# Patient Record
Sex: FEMALE | Race: BLACK OR AFRICAN AMERICAN | NOT HISPANIC OR LATINO | URBAN - METROPOLITAN AREA
[De-identification: names, ages, dates, MRNs, and addresses within clinical notes are randomized per-mention and may not be internally consistent; named-entity substitution may affect disease eponyms.]

---

## 2017-08-07 PROBLEM — 2901004 MELENA: Status: ACTIVE | Noted: 2017-08-07

## 2020-01-08 PROBLEM — 238131007 OVERWEIGHT: Status: ACTIVE | Noted: 2020-01-08

## 2020-11-18 ENCOUNTER — OFFICE VISIT (OUTPATIENT)
Dept: URBAN - METROPOLITAN AREA SURGERY CENTER 7 | Facility: SURGERY CENTER | Age: 50
End: 2020-11-18

## 2022-04-30 ENCOUNTER — TELEPHONE ENCOUNTER (OUTPATIENT)
Dept: URBAN - METROPOLITAN AREA CLINIC 121 | Facility: CLINIC | Age: 52
End: 2022-04-30

## 2022-04-30 RX ORDER — FLUOROMETHOLONE 2.5 MG/ML
BID SUSPENSION/ DROPS OPHTHALMIC
OUTPATIENT
Start: 2015-04-22

## 2022-04-30 RX ORDER — ADALIMUMAB 40MG/0.4ML
KIT SUBCUTANEOUS
OUTPATIENT
Start: 2020-01-08

## 2022-04-30 RX ORDER — BUDESONIDE 9 MG/1
1 TABLET PO QD TABLET, EXTENDED RELEASE ORAL
OUTPATIENT
Start: 2015-04-22 | End: 2017-08-07

## 2022-04-30 RX ORDER — ADALIMUMAB 40MG/0.8ML
INJECT ONE PEN (40 MG) SUBCUTANEOUSLY EVERY WEEK. REFRIGERATE KIT SUBCUTANEOUS
OUTPATIENT
Start: 2018-08-09

## 2022-04-30 RX ORDER — SODIUM SULFATE, POTASSIUM SULFATE, MAGNESIUM SULFATE 17.5; 3.13; 1.6 G/ML; G/ML; G/ML
MIX AND USE AS DIRECTED. MAY SUB FOR GOLYTELY SOLUTION, CONCENTRATE ORAL
OUTPATIENT
Start: 2020-11-09

## 2022-04-30 RX ORDER — FLUOROMETHOLONE 2.5 MG/ML
BID SUSPENSION/ DROPS OPHTHALMIC
OUTPATIENT
Start: 2015-04-22 | End: 2017-08-07

## 2022-04-30 RX ORDER — DICLOFENAC SODIUM 50 MG/1
BID TABLET, DELAYED RELEASE ORAL
OUTPATIENT
Start: 2015-04-22 | End: 2017-08-07

## 2022-04-30 RX ORDER — BUDESONIDE 9 MG/1
1 TABLET PO QD TABLET, EXTENDED RELEASE ORAL
OUTPATIENT
Start: 2015-04-22

## 2022-04-30 RX ORDER — HEPATITIS A AND HEPATITIS B (RECOMBINANT) VACCINE 720; 20 [IU]/ML; UG/ML
INJECT 1 VIAL  IM AS DIRECTED...3 SERIES SHOT...0, 1, 6 MONTHS INJECTION, SUSPENSION INTRAMUSCULAR
OUTPATIENT
Start: 2020-01-09

## 2022-04-30 RX ORDER — POLYETHYLENE GLYCOL 3350, SODIUM SULFATE, SODIUM CHLORIDE, POTASSIUM CHLORIDE, ASCORBIC ACID, SODIUM ASCORBATE 140-9-5.2G
MIX AND USE DIRECTED.   USE CO-PAY CARD BELOW:  BIN 019158  PCN: CNRX GROUP: AC68037001 ID: 5937377999 KIT ORAL
OUTPATIENT
Start: 2020-11-04

## 2022-04-30 RX ORDER — ZOLPIDEM TARTRATE 5 MG
TABLET ORAL
OUTPATIENT
Start: 2020-01-08

## 2022-04-30 RX ORDER — ADALIMUMAB 40MG/0.8ML
INJECT ONE PEN SUBCUTANEOUSLY EVERY WEEK KIT SUBCUTANEOUS
OUTPATIENT
Start: 2020-01-08

## 2022-04-30 RX ORDER — DICLOFENAC SODIUM 50 MG/1
BID TABLET, DELAYED RELEASE ORAL
OUTPATIENT
Start: 2015-04-22

## 2022-04-30 RX ORDER — TRAMADOL HYDROCHLORIDE 50 MG/1
TABLET, FILM COATED ORAL
OUTPATIENT
Start: 2017-08-07

## 2022-04-30 RX ORDER — ADALIMUMAB 40MG/0.8ML
INJECT 40MG SUBQ ONCE A WEEK AS DIRECTED KIT SUBCUTANEOUS
OUTPATIENT
Start: 2015-10-05

## 2022-04-30 RX ORDER — IBUPROFEN 800 MG/1
TABLET, FILM COATED ORAL
OUTPATIENT
Start: 2020-01-08

## 2022-04-30 RX ORDER — ADALIMUMAB 40MG/0.8ML
INJECT ONE PEN SUBCUTANEOUSLY EVERY WEEK. REFRIGERATE KIT SUBCUTANEOUS
OUTPATIENT
Start: 2021-03-26

## 2022-05-01 ENCOUNTER — TELEPHONE ENCOUNTER (OUTPATIENT)
Dept: URBAN - METROPOLITAN AREA CLINIC 121 | Facility: CLINIC | Age: 52
End: 2022-05-01

## 2022-05-01 RX ORDER — SODIUM SULFATE, POTASSIUM SULFATE, MAGNESIUM SULFATE 17.5; 3.13; 1.6 G/ML; G/ML; G/ML
DISSOLVE BY MOUTH AS DIRECTED SOLUTION, CONCENTRATE ORAL
Status: ACTIVE | COMMUNITY
Start: 2020-11-09

## 2022-05-01 RX ORDER — IBUPROFEN 800 MG/1
TABLET BY MOUTH TABLET, FILM COATED ORAL
Status: ACTIVE | COMMUNITY
Start: 2020-01-08

## 2022-05-01 RX ORDER — HEPATITIS A AND HEPATITIS B (RECOMBINANT) VACCINE 720; 20 [IU]/ML; UG/ML
INJECT 1 VIAL  IM AS DIRECTED...3 SERIES SHOT...0, 1, 6 MONTHS INJECTION, SUSPENSION INTRAMUSCULAR
Status: ACTIVE | COMMUNITY
Start: 2020-01-09

## 2022-05-01 RX ORDER — ZOLPIDEM TARTRATE 5 MG
TABLET BY MOUTH TABLET ORAL
Status: ACTIVE | COMMUNITY
Start: 2020-01-08

## 2022-05-01 RX ORDER — ADALIMUMAB 40MG/0.4ML
KIT SUBCUTANEOUS
Status: ACTIVE | COMMUNITY
Start: 2020-01-08

## 2022-05-01 RX ORDER — TRAMADOL HYDROCHLORIDE 50 MG/1
TABLET BY MOUTH TABLET, FILM COATED ORAL
Status: ACTIVE | COMMUNITY
Start: 2017-08-07

## 2022-05-01 RX ORDER — ADALIMUMAB 40MG/0.8ML
INJECT 1 PEN INJECTOR SUBCUTANEOUSLY ONCE A WEEK KIT SUBCUTANEOUS
Status: ACTIVE | COMMUNITY
Start: 2021-03-26 | End: 2022-07-29

## 2022-05-01 RX ORDER — POLYETHYLENE GLYCOL 3350, SODIUM SULFATE, SODIUM CHLORIDE, POTASSIUM CHLORIDE, ASCORBIC ACID, SODIUM ASCORBATE 140-9-5.2G
DISSOLVE AS DIRECTED KIT ORAL
Status: ACTIVE | COMMUNITY
Start: 2020-11-04

## 2022-08-24 ENCOUNTER — TELEPHONE ENCOUNTER (OUTPATIENT)
Dept: URBAN - METROPOLITAN AREA CLINIC 27 | Facility: CLINIC | Age: 52
End: 2022-08-24

## 2022-09-02 ENCOUNTER — OFFICE VISIT (OUTPATIENT)
Dept: URBAN - METROPOLITAN AREA CLINIC 27 | Facility: CLINIC | Age: 52
End: 2022-09-02
Payer: COMMERCIAL

## 2022-09-02 ENCOUNTER — WEB ENCOUNTER (OUTPATIENT)
Dept: URBAN - METROPOLITAN AREA CLINIC 27 | Facility: CLINIC | Age: 52
End: 2022-09-02

## 2022-09-02 VITALS
DIASTOLIC BLOOD PRESSURE: 69 MMHG | WEIGHT: 152 LBS | SYSTOLIC BLOOD PRESSURE: 107 MMHG | HEART RATE: 88 BPM | HEIGHT: 66 IN | RESPIRATION RATE: 17 BRPM | BODY MASS INDEX: 24.43 KG/M2 | TEMPERATURE: 96.9 F

## 2022-09-02 DIAGNOSIS — M06.9 RHEUMATOID ARTHRITIS, INVOLVING UNSPECIFIED SITE, UNSPECIFIED WHETHER RHEUMATOID FACTOR PRESENT: ICD-10-CM

## 2022-09-02 DIAGNOSIS — K50.90 CROHN'S DISEASE WITHOUT COMPLICATION, UNSPECIFIED GASTROINTESTINAL TRACT LOCATION: ICD-10-CM

## 2022-09-02 PROBLEM — 69896004: Status: ACTIVE | Noted: 2022-09-02

## 2022-09-02 PROBLEM — 34000006: Status: ACTIVE | Noted: 2022-09-02

## 2022-09-02 PROCEDURE — 99214 OFFICE O/P EST MOD 30 MIN: CPT | Performed by: INTERNAL MEDICINE

## 2022-09-02 RX ORDER — POLYETHYLENE GLYCOL 3350, SODIUM SULFATE, SODIUM CHLORIDE, POTASSIUM CHLORIDE, ASCORBIC ACID, SODIUM ASCORBATE 140-9-5.2G
DISSOLVE AS DIRECTED KIT ORAL
Status: DISCONTINUED | COMMUNITY
Start: 2020-11-04

## 2022-09-02 RX ORDER — SODIUM SULFATE, POTASSIUM SULFATE, MAGNESIUM SULFATE 17.5; 3.13; 1.6 G/ML; G/ML; G/ML
DISSOLVE BY MOUTH AS DIRECTED SOLUTION, CONCENTRATE ORAL
Status: ACTIVE | COMMUNITY
Start: 2020-11-09

## 2022-09-02 RX ORDER — ZOLPIDEM TARTRATE 5 MG
TABLET BY MOUTH TABLET ORAL
Status: ACTIVE | COMMUNITY
Start: 2020-01-08

## 2022-09-02 RX ORDER — IBUPROFEN 800 MG/1
TABLET BY MOUTH TABLET, FILM COATED ORAL
Status: DISCONTINUED | COMMUNITY
Start: 2020-01-08

## 2022-09-02 RX ORDER — ADALIMUMAB 40MG/0.4ML
AS DIRECTED KIT SUBCUTANEOUS
Qty: 30 | Refills: 3

## 2022-09-02 RX ORDER — HEPATITIS A AND HEPATITIS B (RECOMBINANT) VACCINE 720; 20 [IU]/ML; UG/ML
INJECT 1 VIAL  IM AS DIRECTED...3 SERIES SHOT...0, 1, 6 MONTHS INJECTION, SUSPENSION INTRAMUSCULAR
Status: ACTIVE | COMMUNITY
Start: 2020-01-09

## 2022-09-02 RX ORDER — ADALIMUMAB 40MG/0.4ML
KIT SUBCUTANEOUS
Status: ACTIVE | COMMUNITY
Start: 2020-01-08

## 2022-09-02 RX ORDER — TRAMADOL HYDROCHLORIDE 50 MG/1
TABLET BY MOUTH TABLET, FILM COATED ORAL
Status: ACTIVE | COMMUNITY
Start: 2017-08-07

## 2022-09-02 NOTE — HPI-TODAY'S VISIT:
Ms. Duarte is a 52-year-old established patient with Crohn's disease last seen in the office in 2020. Her disease has been stable for many years, and she is maintained on Humira once weekly injection. She has rheumatoid arthritis and followed by Dr. Saeed. She recently had labs at his office. She is up-to-date on her eye exam. Her last DEXA scan was 3 years ago. States she does not want another one. No skin rashes. She reports 2-3 nonbloody formed stool per day. She denies nausea, vomiting, abd pain, diarrhea, constipation, melena or hematochezia. 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 Bx - mild chronic ileitis. Otherwise, Quiescent appearance. She is due for a surveillance colonoscopy. Previous Rx for Hep A/B vaccine series noted in her chart. She indicates she received the vaccines.

## 2022-09-20 ENCOUNTER — TELEPHONE ENCOUNTER (OUTPATIENT)
Dept: URBAN - METROPOLITAN AREA CLINIC 27 | Facility: CLINIC | Age: 52
End: 2022-09-20

## 2022-09-20 RX ORDER — ADALIMUMAB 40MG/0.4ML
AS DIRECTED KIT SUBCUTANEOUS
Qty: 30 | Refills: 3

## 2022-09-23 ENCOUNTER — TELEPHONE ENCOUNTER (OUTPATIENT)
Dept: URBAN - METROPOLITAN AREA CLINIC 27 | Facility: CLINIC | Age: 52
End: 2022-09-23

## 2023-01-09 ENCOUNTER — TELEPHONE ENCOUNTER (OUTPATIENT)
Dept: URBAN - METROPOLITAN AREA CLINIC 27 | Facility: CLINIC | Age: 53
End: 2023-01-09

## 2023-01-09 RX ORDER — ADALIMUMAB 40MG/0.4ML
AS DIRECTED KIT SUBCUTANEOUS
Qty: 30 | Refills: 3
End: 2023-05-09

## 2023-05-17 ENCOUNTER — TELEPHONE ENCOUNTER (OUTPATIENT)
Dept: URBAN - METROPOLITAN AREA CLINIC 27 | Facility: CLINIC | Age: 53
End: 2023-05-17

## 2023-05-17 RX ORDER — ADALIMUMAB 40MG/0.4ML
INJECT 1 PEN KIT SUBCUTANEOUS
Qty: 2 KIT | Refills: 4

## 2023-05-22 ENCOUNTER — TELEPHONE ENCOUNTER (OUTPATIENT)
Dept: URBAN - METROPOLITAN AREA CLINIC 27 | Facility: CLINIC | Age: 53
End: 2023-05-22

## 2023-05-22 RX ORDER — ADALIMUMAB 40MG/0.4ML
INJECT 1 PEN KIT SUBCUTANEOUS
Qty: 2 KIT | Refills: 4
End: 2023-10-19

## 2023-06-26 ENCOUNTER — OFFICE VISIT (OUTPATIENT)
Dept: URBAN - METROPOLITAN AREA CLINIC 27 | Facility: CLINIC | Age: 53
End: 2023-06-26
Payer: COMMERCIAL

## 2023-06-26 ENCOUNTER — LAB OUTSIDE AN ENCOUNTER (OUTPATIENT)
Dept: URBAN - METROPOLITAN AREA CLINIC 27 | Facility: CLINIC | Age: 53
End: 2023-06-26

## 2023-06-26 VITALS
DIASTOLIC BLOOD PRESSURE: 74 MMHG | WEIGHT: 155 LBS | HEART RATE: 89 BPM | SYSTOLIC BLOOD PRESSURE: 104 MMHG | BODY MASS INDEX: 24.91 KG/M2 | HEIGHT: 66 IN | RESPIRATION RATE: 17 BRPM

## 2023-06-26 DIAGNOSIS — K50.90 ABDOMINAL PAIN DESPITE THERAPY FOR CROHN'S DISEASE: ICD-10-CM

## 2023-06-26 DIAGNOSIS — R14.0 ABDOMINAL BLOATING: ICD-10-CM

## 2023-06-26 PROCEDURE — 99214 OFFICE O/P EST MOD 30 MIN: CPT | Performed by: INTERNAL MEDICINE

## 2023-06-26 RX ORDER — ADALIMUMAB 40MG/0.4ML
AS DIRECTED KIT SUBCUTANEOUS
Qty: 4 | Refills: 11 | OUTPATIENT
Start: 2023-06-26 | End: 2024-06-20

## 2023-06-26 RX ORDER — SODIUM SULFATE, POTASSIUM SULFATE, MAGNESIUM SULFATE 17.5; 3.13; 1.6 G/ML; G/ML; G/ML
DISSOLVE BY MOUTH AS DIRECTED SOLUTION, CONCENTRATE ORAL
Status: ACTIVE | COMMUNITY
Start: 2020-11-09

## 2023-06-26 RX ORDER — HEPATITIS A AND HEPATITIS B (RECOMBINANT) VACCINE 720; 20 [IU]/ML; UG/ML
INJECT 1 VIAL  IM AS DIRECTED...3 SERIES SHOT...0, 1, 6 MONTHS INJECTION, SUSPENSION INTRAMUSCULAR
Status: ACTIVE | COMMUNITY
Start: 2020-01-09

## 2023-06-26 RX ORDER — ADALIMUMAB 40MG/0.4ML
INJECT 1 PEN KIT SUBCUTANEOUS
Qty: 2 KIT | Refills: 4 | Status: ACTIVE | COMMUNITY
End: 2023-10-19

## 2023-06-26 RX ORDER — ZOLPIDEM TARTRATE 5 MG
TABLET BY MOUTH TABLET ORAL
Status: ACTIVE | COMMUNITY
Start: 2020-01-08

## 2023-06-26 RX ORDER — TRAMADOL HYDROCHLORIDE 50 MG/1
TABLET BY MOUTH TABLET, FILM COATED ORAL
Status: ACTIVE | COMMUNITY
Start: 2017-08-07

## 2023-06-26 NOTE — HPI-ZZZTODAY'S VISIT
Ms. Duarte is a 53-year-old female patient of Dr. Griffin presenting for colonoscopy.  She has Crohn's disease, last seen 1 year ago, disease has been stable for many years.  She is on Humira weekly, though ran out approx. 1 month ago. BMs are regular, no abdominal pain. She has rare episodes of trace red blood per on the TP which she attributes to hemorrhoids. She takes Miralax QD for bloating though this is not very helpful; she denies constipation even in the absence of Miralax.  She has RA and is followed by Dr. Saeed.  Last DEXA scan was 3 years ago, normal, does not want another at this time.  . Colonoscopy 2020: Noninflamed stricture at the ileocecal valve; path shows mild chronic ileitis; otherwise quiescent appearing; recall 2 years

## 2023-06-27 ENCOUNTER — TELEPHONE ENCOUNTER (OUTPATIENT)
Dept: URBAN - METROPOLITAN AREA CLINIC 27 | Facility: CLINIC | Age: 53
End: 2023-06-27

## 2023-06-27 LAB
A/G RATIO: 1.3
ABSOLUTE BASOPHILS: 38
ABSOLUTE EOSINOPHILS: 62
ABSOLUTE LYMPHOCYTES: 1661
ABSOLUTE MONOCYTES: 787
ABSOLUTE NEUTROPHILS: 2251
ALBUMIN: 4.2
ALKALINE PHOSPHATASE: 69
ALT (SGPT): 22
AST (SGOT): 25
BASOPHILS: 0.8
BILIRUBIN, TOTAL: 0.3
BUN/CREATININE RATIO: (no result)
BUN: 12
CALCIUM: 9.4
CARBON DIOXIDE, TOTAL: 24
CHLORIDE: 106
CREATININE: 0.79
EGFR: 91
EOSINOPHILS: 1.3
FOLATE (FOLIC ACID), SERUM: 12.6
GLOBULIN, TOTAL: 3.3
GLUCOSE: 66
HEMATOCRIT: 32.5
HEMOGLOBIN: 10.8
LYMPHOCYTES: 34.6
MCH: 27.9
MCHC: 33.2
MCV: 84
MONOCYTES: 16.4
MPV: 9.9
NEUTROPHILS: 46.9
PLATELET COUNT: 355
POTASSIUM: 4.3
PROTEIN, TOTAL: 7.5
RDW: 16
RED BLOOD CELL COUNT: 3.87
SODIUM: 141
VITAMIN B12: 419
WHITE BLOOD CELL COUNT: 4.8

## 2023-06-27 RX ORDER — ADALIMUMAB 40MG/0.4ML
AS DIRECTED KIT SUBCUTANEOUS
Qty: 4 | Refills: 11
Start: 2023-06-26 | End: 2024-06-21

## 2023-07-25 ENCOUNTER — TELEPHONE ENCOUNTER (OUTPATIENT)
Dept: URBAN - METROPOLITAN AREA CLINIC 27 | Facility: CLINIC | Age: 53
End: 2023-07-25

## 2023-08-23 ENCOUNTER — OFFICE VISIT (OUTPATIENT)
Dept: URBAN - METROPOLITAN AREA SURGERY CENTER 7 | Facility: SURGERY CENTER | Age: 53
End: 2023-08-23

## 2023-09-05 ENCOUNTER — WEB ENCOUNTER (OUTPATIENT)
Dept: URBAN - METROPOLITAN AREA CLINIC 27 | Facility: CLINIC | Age: 53
End: 2023-09-05

## 2023-09-05 ENCOUNTER — OFFICE VISIT (OUTPATIENT)
Dept: URBAN - METROPOLITAN AREA CLINIC 27 | Facility: CLINIC | Age: 53
End: 2023-09-05
Payer: COMMERCIAL

## 2023-09-05 ENCOUNTER — DASHBOARD ENCOUNTERS (OUTPATIENT)
Age: 53
End: 2023-09-05

## 2023-09-05 VITALS
WEIGHT: 153 LBS | SYSTOLIC BLOOD PRESSURE: 105 MMHG | BODY MASS INDEX: 24.59 KG/M2 | HEIGHT: 66 IN | DIASTOLIC BLOOD PRESSURE: 73 MMHG | HEART RATE: 100 BPM

## 2023-09-05 DIAGNOSIS — K50.90 CROHN'S DISEASE: ICD-10-CM

## 2023-09-05 DIAGNOSIS — R14.0 ABDOMINAL BLOATING: ICD-10-CM

## 2023-09-05 DIAGNOSIS — K21.9 ACID REFLUX: ICD-10-CM

## 2023-09-05 PROBLEM — 116289008: Status: ACTIVE | Noted: 2023-06-26

## 2023-09-05 PROBLEM — 235595009: Status: ACTIVE | Noted: 2023-09-05

## 2023-09-05 PROCEDURE — 99214 OFFICE O/P EST MOD 30 MIN: CPT | Performed by: INTERNAL MEDICINE

## 2023-09-05 RX ORDER — SODIUM SULFATE, POTASSIUM SULFATE, MAGNESIUM SULFATE 17.5; 3.13; 1.6 G/ML; G/ML; G/ML
DISSOLVE BY MOUTH AS DIRECTED SOLUTION, CONCENTRATE ORAL
Status: ACTIVE | COMMUNITY
Start: 2020-11-09

## 2023-09-05 RX ORDER — ADALIMUMAB 40MG/0.4ML
AS DIRECTED KIT SUBCUTANEOUS
Qty: 4 | Refills: 11 | Status: ACTIVE | COMMUNITY
Start: 2023-06-26 | End: 2024-06-21

## 2023-09-05 RX ORDER — TRAMADOL HYDROCHLORIDE 50 MG/1
TABLET BY MOUTH TABLET, FILM COATED ORAL
Status: ACTIVE | COMMUNITY
Start: 2017-08-07

## 2023-09-05 RX ORDER — ZOLPIDEM TARTRATE 5 MG
TABLET BY MOUTH TABLET ORAL
Status: ACTIVE | COMMUNITY
Start: 2020-01-08

## 2023-09-05 RX ORDER — HEPATITIS A AND HEPATITIS B (RECOMBINANT) VACCINE 720; 20 [IU]/ML; UG/ML
INJECT 1 VIAL  IM AS DIRECTED...3 SERIES SHOT...0, 1, 6 MONTHS INJECTION, SUSPENSION INTRAMUSCULAR
Status: ACTIVE | COMMUNITY
Start: 2020-01-09

## 2023-09-05 RX ORDER — ADALIMUMAB 40MG/0.4ML
INJECT 1 PEN KIT SUBCUTANEOUS
Qty: 2 KIT | Refills: 4 | Status: ACTIVE | COMMUNITY
End: 2023-10-19

## 2023-09-05 NOTE — HPI-ZZZTODAY'S VISIT
Ms. Duarte is a 53-year-old female patient of Dr. Griffin presenting for evaluation of heartburn and bloating. She has Crohn's disease, stable on weekly Humira. She has colonoscopy scheduled for next month.  Labs were drawn in June showing mild anemia. She presents today c/o intermittent abdominal cramping with associated distention/bloating as well as recent onset heartburn. She has heartburn sx once or twice weekly, takes Rolaids prn. She is taking Align, Miralax with partial improvement of her bloating. BMs are moving well, no constipation. She is s/p hysterectomy. . Colonoscopy 2020:Noninflamed stricture at the ileocecal valve; path shows mild chronic ileitis; otherwise quiescent appearing; recall 2 years

## 2023-09-05 NOTE — PHYSICAL EXAM GASTROINTESTINAL
Abdomen , soft, mild LLQ TTP, nondistended , no guarding or rigidity , no masses palpable , normal bowel sounds , Liver and Spleen , no hepatomegaly present , no hepatosplenomegaly , liver nontender , spleen not palpable

## 2023-09-06 ENCOUNTER — LAB OUTSIDE AN ENCOUNTER (OUTPATIENT)
Dept: URBAN - METROPOLITAN AREA CLINIC 27 | Facility: CLINIC | Age: 53
End: 2023-09-06

## 2023-09-11 LAB
H PYLORI BREATH TEST: NOT DETECTED
H. PYLORI BREATH TEST: NOT DETECTED
INTERPRETATION: NOT DETECTED

## 2023-09-13 ENCOUNTER — OFFICE VISIT (OUTPATIENT)
Dept: URBAN - METROPOLITAN AREA SURGERY CENTER 7 | Facility: SURGERY CENTER | Age: 53
End: 2023-09-13

## 2023-09-27 ENCOUNTER — OFFICE VISIT (OUTPATIENT)
Dept: URBAN - METROPOLITAN AREA SURGERY CENTER 7 | Facility: SURGERY CENTER | Age: 53
End: 2023-09-27

## 2023-10-04 ENCOUNTER — OFFICE VISIT (OUTPATIENT)
Dept: URBAN - METROPOLITAN AREA SURGERY CENTER 7 | Facility: SURGERY CENTER | Age: 53
End: 2023-10-04

## 2023-10-11 ENCOUNTER — OFFICE VISIT (OUTPATIENT)
Dept: URBAN - METROPOLITAN AREA SURGERY CENTER 7 | Facility: SURGERY CENTER | Age: 53
End: 2023-10-11

## 2023-10-27 ENCOUNTER — OFFICE VISIT (OUTPATIENT)
Dept: URBAN - METROPOLITAN AREA SURGERY CENTER 7 | Facility: SURGERY CENTER | Age: 53
End: 2023-10-27
Payer: COMMERCIAL

## 2023-10-27 ENCOUNTER — CLAIMS CREATED FROM THE CLAIM WINDOW (OUTPATIENT)
Dept: URBAN - METROPOLITAN AREA CLINIC 4 | Facility: CLINIC | Age: 53
End: 2023-10-27
Payer: COMMERCIAL

## 2023-10-27 DIAGNOSIS — K63.89 OTHER SPECIFIED DISEASES OF INTESTINE: ICD-10-CM

## 2023-10-27 DIAGNOSIS — K52.9 INFLAMMATION OF COLONIC MUCOSA: ICD-10-CM

## 2023-10-27 DIAGNOSIS — K50.90 CROHN DISEASE: ICD-10-CM

## 2023-10-27 DIAGNOSIS — K63.89 APPENDICITIS EPIPLOICA: ICD-10-CM

## 2023-10-27 PROCEDURE — 45380 COLONOSCOPY AND BIOPSY: CPT | Performed by: INTERNAL MEDICINE

## 2023-10-27 PROCEDURE — 00811 ANES LWR INTST NDSC NOS: CPT | Performed by: NURSE ANESTHETIST, CERTIFIED REGISTERED

## 2023-10-27 PROCEDURE — G8907 PT DOC NO EVENTS ON DISCHARG: HCPCS | Performed by: INTERNAL MEDICINE

## 2023-10-27 PROCEDURE — 88305 TISSUE EXAM BY PATHOLOGIST: CPT | Performed by: PATHOLOGY

## 2023-10-27 RX ORDER — ZOLPIDEM TARTRATE 5 MG
TABLET BY MOUTH TABLET ORAL
Status: ACTIVE | COMMUNITY
Start: 2020-01-08

## 2023-10-27 RX ORDER — SODIUM SULFATE, POTASSIUM SULFATE, MAGNESIUM SULFATE 17.5; 3.13; 1.6 G/ML; G/ML; G/ML
DISSOLVE BY MOUTH AS DIRECTED SOLUTION, CONCENTRATE ORAL
Status: ACTIVE | COMMUNITY
Start: 2020-11-09

## 2023-10-27 RX ORDER — ADALIMUMAB 40MG/0.4ML
AS DIRECTED KIT SUBCUTANEOUS
Qty: 4 | Refills: 11 | Status: ACTIVE | COMMUNITY
Start: 2023-06-26 | End: 2024-06-21

## 2023-10-27 RX ORDER — TRAMADOL HYDROCHLORIDE 50 MG/1
TABLET BY MOUTH TABLET, FILM COATED ORAL
Status: ACTIVE | COMMUNITY
Start: 2017-08-07

## 2023-10-27 RX ORDER — HEPATITIS A AND HEPATITIS B (RECOMBINANT) VACCINE 720; 20 [IU]/ML; UG/ML
INJECT 1 VIAL  IM AS DIRECTED...3 SERIES SHOT...0, 1, 6 MONTHS INJECTION, SUSPENSION INTRAMUSCULAR
Status: ACTIVE | COMMUNITY
Start: 2020-01-09

## 2024-05-28 ENCOUNTER — TELEPHONE ENCOUNTER (OUTPATIENT)
Dept: URBAN - METROPOLITAN AREA CLINIC 27 | Facility: CLINIC | Age: 54
End: 2024-05-28

## 2025-01-22 ENCOUNTER — ERX REFILL RESPONSE (OUTPATIENT)
Dept: URBAN - METROPOLITAN AREA CLINIC 27 | Facility: CLINIC | Age: 55
End: 2025-01-22

## 2025-01-22 RX ORDER — ADALIMUMAB-ADAZ 40 MG/.4ML
AS DIRECTED INJECTION, SOLUTION SUBCUTANEOUS
Qty: 4 | Refills: 11 | OUTPATIENT

## 2025-01-22 RX ORDER — ADALIMUMAB-ADAZ 40 MG/.4ML
INJECT 1 PEN UNDER THE SKIN EVERY 7 DAYS INJECTION, SOLUTION SUBCUTANEOUS
Qty: 4 | Refills: 11 | OUTPATIENT

## 2025-07-01 ENCOUNTER — TELEPHONE ENCOUNTER (OUTPATIENT)
Dept: URBAN - METROPOLITAN AREA CLINIC 25 | Facility: CLINIC | Age: 55
End: 2025-07-01

## 2025-07-01 RX ORDER — ADALIMUMAB-ADAZ 40 MG/.4ML
INJECT 1 PEN UNDER THE SKIN EVERY 7 DAYS INJECTION, SOLUTION SUBCUTANEOUS
Qty: 4 | Refills: 11

## 2025-07-02 ENCOUNTER — TELEPHONE ENCOUNTER (OUTPATIENT)
Dept: URBAN - METROPOLITAN AREA CLINIC 25 | Facility: CLINIC | Age: 55
End: 2025-07-02